# Patient Record
Sex: MALE | Race: WHITE | Employment: FULL TIME | URBAN - METROPOLITAN AREA
[De-identification: names, ages, dates, MRNs, and addresses within clinical notes are randomized per-mention and may not be internally consistent; named-entity substitution may affect disease eponyms.]

---

## 2018-03-26 ENCOUNTER — HOSPITAL ENCOUNTER (OUTPATIENT)
Dept: PHYSICAL THERAPY | Age: 63
Setting detail: THERAPIES SERIES
Discharge: HOME | End: 2018-03-26
Payer: COMMERCIAL

## 2018-03-26 PROCEDURE — 97161 PT EVAL LOW COMPLEX 20 MIN: CPT | Performed by: PHYSICAL THERAPIST

## 2018-03-26 PROCEDURE — G0283 ELEC STIM OTHER THAN WOUND: HCPCS | Performed by: PHYSICAL THERAPIST

## 2018-03-26 NOTE — PROGRESS NOTES
Physical Therapy Progress Note    Date: 3/26/2018  Patient Name: Ivana Harrison  : 1955   MRN: [id number]    S: See eval  O:  DX  Right shoulder strain 3x/wk , 4 wks     timein/timeout 08:00-08:55 [facility]     Visit #/Visits left                        pain 7 /10               Pulleys shoulder flex       IR wand standing       Overhead press              Biceps curls       Triceps                     Mid row        Pull down        Low row                                          shrugs                                                                 A: Pt tolerated Tx well  P: Continue POC  Radha [place] PT
ir=  to L4 spinal level    Elbow     WNL      LUE   na   RLE   na   LLE   na   Trunk   na   C-spine   na     Strength    RUE         Shoulder       flex= 4/5    abd =   4-/5        ir=   4/5                    Er= 4-/5     LUE   na   RLE   na   LLE   na   Trunk   na   C-spine   na       Special Tests     Right   Impingement pos   Empty Can neg   AC shear neg   LABRUM TEST pos         Palpation:   No tenderness to palpation noted    Assessment    Problems:    1. Pain 1-8/10  2. Decreased ROM right shoulder   3. Decreased strength RUE  4. Light duty    STGs: 2 weeks  1. Decrease pain to 5/10  2. Increase ROM WNL  3. Increase strength RUE BY 1/3 MM GRADE  4. Pt Ind with HEP    LTGS: 4 weeks  1. Decrease pain to 0-3/10  2. Increase ROM to WNL  3. Increase strength  To WNL  4. RTW regular duty    Patient Goal:   RTW  Prognosis:    Good    Plan Of Care    Frequency:  3X /week  Duration:      4  weeks    Modalities:  MH, ES   Exercise:   AROM, PREs, functional activities, HEP    Jomar Higuera PT    Thank you for this referral.   Please sign this POC and fax to our office if you are in agreement.    Fax # [phone]    _X__________________________                                  ____________        Physician Signature                                                             Date

## 2018-04-20 ENCOUNTER — HOSPITAL ENCOUNTER (OUTPATIENT)
Age: 63
Discharge: HOME | End: 2018-04-20
Payer: COMMERCIAL

## 2018-04-20 LAB
EKG ATRIAL RATE: 79 BPM
EKG P AXIS: 45 DEGREES
EKG P-R INTERVAL: 180 MS
EKG Q-T INTERVAL: 404 MS
EKG QRS DURATION: 116 MS
EKG QTC CALCULATION (BAZETT): 463 MS
EKG R AXIS: -85 DEGREES
EKG T AXIS: 18 DEGREES
EKG VENTRICULAR RATE: 79 BPM

## 2018-04-20 PROCEDURE — 93005 ELECTROCARDIOGRAM TRACING: CPT | Performed by: ANESTHESIOLOGY

## 2018-05-11 ENCOUNTER — OFFICE VISIT (OUTPATIENT)
Dept: CARDIOLOGY CLINIC | Age: 63
End: 2018-05-11
Payer: COMMERCIAL

## 2018-05-11 VITALS
HEIGHT: 71 IN | HEART RATE: 84 BPM | DIASTOLIC BLOOD PRESSURE: 70 MMHG | SYSTOLIC BLOOD PRESSURE: 110 MMHG | BODY MASS INDEX: 44.1 KG/M2 | WEIGHT: 315 LBS

## 2018-05-11 DIAGNOSIS — E66.01 MORBID OBESITY (HCC): ICD-10-CM

## 2018-05-11 DIAGNOSIS — I10 ESSENTIAL HYPERTENSION: Primary | ICD-10-CM

## 2018-05-11 DIAGNOSIS — J44.9 CHRONIC OBSTRUCTIVE PULMONARY DISEASE, UNSPECIFIED COPD TYPE (HCC): ICD-10-CM

## 2018-05-11 DIAGNOSIS — G47.33 OBSTRUCTIVE SLEEP APNEA: ICD-10-CM

## 2018-05-11 DIAGNOSIS — Z01.810 PREOPERATIVE CARDIOVASCULAR EXAMINATION: ICD-10-CM

## 2018-05-11 DIAGNOSIS — E78.00 PURE HYPERCHOLESTEROLEMIA: ICD-10-CM

## 2018-05-11 PROCEDURE — 99244 OFF/OP CNSLTJ NEW/EST MOD 40: CPT | Performed by: INTERNAL MEDICINE

## 2018-05-11 PROCEDURE — 93000 ELECTROCARDIOGRAM COMPLETE: CPT | Performed by: INTERNAL MEDICINE

## 2018-05-11 RX ORDER — LOSARTAN POTASSIUM 100 MG/1
100 TABLET ORAL DAILY
COMMUNITY
Start: 2018-04-26

## 2018-05-11 RX ORDER — UBIDECARENONE 50 MG
1200 CAPSULE ORAL DAILY
COMMUNITY

## 2018-05-11 RX ORDER — BLACK COHOSH ROOT EXTRACT 80 MG
1 CAPSULE ORAL DAILY
COMMUNITY

## 2018-05-11 RX ORDER — BACITRACIN 500 UNIT/G
2 OINTMENT (GRAM) TOPICAL DAILY
COMMUNITY

## 2018-05-23 RX ORDER — BACLOFEN 10 MG/1
10 TABLET ORAL NIGHTLY
COMMUNITY

## 2018-05-23 RX ORDER — DICLOFENAC SODIUM 75 MG/1
75 TABLET, DELAYED RELEASE ORAL 2 TIMES DAILY
COMMUNITY

## 2018-05-24 ENCOUNTER — ANESTHESIA EVENT (OUTPATIENT)
Dept: OPERATING ROOM | Age: 63
End: 2018-05-24
Payer: COMMERCIAL

## 2018-05-24 ENCOUNTER — ANESTHESIA (OUTPATIENT)
Dept: OPERATING ROOM | Age: 63
End: 2018-05-24
Payer: COMMERCIAL

## 2018-05-24 ENCOUNTER — HOSPITAL ENCOUNTER (OUTPATIENT)
Age: 63
Setting detail: OUTPATIENT SURGERY
Discharge: HOME | End: 2018-05-24
Attending: ORTHOPAEDIC SURGERY | Admitting: ORTHOPAEDIC SURGERY
Payer: COMMERCIAL

## 2018-05-24 VITALS
OXYGEN SATURATION: 89 % | RESPIRATION RATE: 1 BRPM | DIASTOLIC BLOOD PRESSURE: 69 MMHG | SYSTOLIC BLOOD PRESSURE: 115 MMHG

## 2018-05-24 VITALS
BODY MASS INDEX: 44.1 KG/M2 | DIASTOLIC BLOOD PRESSURE: 89 MMHG | RESPIRATION RATE: 16 BRPM | SYSTOLIC BLOOD PRESSURE: 134 MMHG | WEIGHT: 315 LBS | TEMPERATURE: 97.4 F | HEIGHT: 71 IN | HEART RATE: 84 BPM | OXYGEN SATURATION: 94 %

## 2018-05-24 DIAGNOSIS — M75.51 BURSITIS OF RIGHT SHOULDER: ICD-10-CM

## 2018-05-24 DIAGNOSIS — M75.41 IMPINGEMENT SYNDROME OF RIGHT SHOULDER: ICD-10-CM

## 2018-05-24 DIAGNOSIS — M75.120 COMPLETE TEAR OF ROTATOR CUFF, UNSPECIFIED LATERALITY: Primary | ICD-10-CM

## 2018-05-24 LAB
ANION GAP SERPL CALCULATED.3IONS-SCNC: 12 MMOL/L (ref 7–16)
BUN BLDV-MCNC: 18 MG/DL (ref 8–23)
CALCIUM SERPL-MCNC: 9.6 MG/DL (ref 8.6–10.2)
CHLORIDE BLD-SCNC: 101 MMOL/L (ref 98–107)
CO2: 28 MMOL/L (ref 22–29)
CREAT SERPL-MCNC: 1.1 MG/DL (ref 0.7–1.2)
GFR AFRICAN AMERICAN: >60
GFR NON-AFRICAN AMERICAN: >60 ML/MIN/1.73
GLUCOSE BLD-MCNC: 114 MG/DL (ref 74–109)
POTASSIUM REFLEX MAGNESIUM: 4.7 MMOL/L (ref 3.5–5)
SODIUM BLD-SCNC: 141 MMOL/L (ref 132–146)

## 2018-05-24 PROCEDURE — 2580000003 HC RX 258: Performed by: ANESTHESIOLOGY

## 2018-05-24 PROCEDURE — C1713 ANCHOR/SCREW BN/BN,TIS/BN: HCPCS | Performed by: ORTHOPAEDIC SURGERY

## 2018-05-24 PROCEDURE — 64415 NJX AA&/STRD BRCH PLXS IMG: CPT | Performed by: ANESTHESIOLOGY

## 2018-05-24 PROCEDURE — 3700000001 HC ADD 15 MINUTES (ANESTHESIA): Performed by: ORTHOPAEDIC SURGERY

## 2018-05-24 PROCEDURE — 6360000002 HC RX W HCPCS: Performed by: ORTHOPAEDIC SURGERY

## 2018-05-24 PROCEDURE — 7100000011 HC PHASE II RECOVERY - ADDTL 15 MIN: Performed by: ORTHOPAEDIC SURGERY

## 2018-05-24 PROCEDURE — 7100000010 HC PHASE II RECOVERY - FIRST 15 MIN: Performed by: ORTHOPAEDIC SURGERY

## 2018-05-24 PROCEDURE — 3700000000 HC ANESTHESIA ATTENDED CARE: Performed by: ORTHOPAEDIC SURGERY

## 2018-05-24 PROCEDURE — 6360000002 HC RX W HCPCS: Performed by: ANESTHESIOLOGY

## 2018-05-24 PROCEDURE — 7100000001 HC PACU RECOVERY - ADDTL 15 MIN: Performed by: ORTHOPAEDIC SURGERY

## 2018-05-24 PROCEDURE — 3600000014 HC SURGERY LEVEL 4 ADDTL 15MIN: Performed by: ORTHOPAEDIC SURGERY

## 2018-05-24 PROCEDURE — 36415 COLL VENOUS BLD VENIPUNCTURE: CPT

## 2018-05-24 PROCEDURE — 2720000010 HC SURG SUPPLY STERILE: Performed by: ORTHOPAEDIC SURGERY

## 2018-05-24 PROCEDURE — 7100000000 HC PACU RECOVERY - FIRST 15 MIN: Performed by: ORTHOPAEDIC SURGERY

## 2018-05-24 PROCEDURE — 6360000002 HC RX W HCPCS: Performed by: NURSE ANESTHETIST, CERTIFIED REGISTERED

## 2018-05-24 PROCEDURE — 2500000003 HC RX 250 WO HCPCS: Performed by: ORTHOPAEDIC SURGERY

## 2018-05-24 PROCEDURE — 2500000003 HC RX 250 WO HCPCS: Performed by: NURSE ANESTHETIST, CERTIFIED REGISTERED

## 2018-05-24 PROCEDURE — A4565 SLINGS: HCPCS | Performed by: ORTHOPAEDIC SURGERY

## 2018-05-24 PROCEDURE — 6370000000 HC RX 637 (ALT 250 FOR IP): Performed by: ORTHOPAEDIC SURGERY

## 2018-05-24 PROCEDURE — 80048 BASIC METABOLIC PNL TOTAL CA: CPT

## 2018-05-24 PROCEDURE — 3600000004 HC SURGERY LEVEL 4 BASE: Performed by: ORTHOPAEDIC SURGERY

## 2018-05-24 DEVICE — ANCHOR SUT W/ ORTHOCORD KNOTLESS FOR ROT CUF REP VERSALOK: Type: IMPLANTABLE DEVICE | Site: SHOULDER | Status: FUNCTIONAL

## 2018-05-24 RX ORDER — LIDOCAINE HYDROCHLORIDE 20 MG/ML
INJECTION, SOLUTION INFILTRATION; PERINEURAL PRN
Status: DISCONTINUED | OUTPATIENT
Start: 2018-05-24 | End: 2018-05-24 | Stop reason: SDUPTHER

## 2018-05-24 RX ORDER — DEXAMETHASONE SODIUM PHOSPHATE 10 MG/ML
INJECTION, SOLUTION INTRAMUSCULAR; INTRAVENOUS PRN
Status: DISCONTINUED | OUTPATIENT
Start: 2018-05-24 | End: 2018-05-24 | Stop reason: SDUPTHER

## 2018-05-24 RX ORDER — MEPERIDINE HYDROCHLORIDE 25 MG/ML
12.5 INJECTION INTRAMUSCULAR; INTRAVENOUS; SUBCUTANEOUS EVERY 5 MIN PRN
Status: DISCONTINUED | OUTPATIENT
Start: 2018-05-24 | End: 2018-05-24 | Stop reason: HOSPADM

## 2018-05-24 RX ORDER — EPINEPHRINE 1 MG/ML
INJECTION, SOLUTION, CONCENTRATE INTRAVENOUS PRN
Status: DISCONTINUED | OUTPATIENT
Start: 2018-05-24 | End: 2018-05-24 | Stop reason: HOSPADM

## 2018-05-24 RX ORDER — FENTANYL CITRATE 50 UG/ML
INJECTION, SOLUTION INTRAMUSCULAR; INTRAVENOUS
Status: DISCONTINUED
Start: 2018-05-24 | End: 2018-05-24 | Stop reason: HOSPADM

## 2018-05-24 RX ORDER — ROCURONIUM BROMIDE 10 MG/ML
INJECTION, SOLUTION INTRAVENOUS PRN
Status: DISCONTINUED | OUTPATIENT
Start: 2018-05-24 | End: 2018-05-24 | Stop reason: SDUPTHER

## 2018-05-24 RX ORDER — PROPOFOL 10 MG/ML
INJECTION, EMULSION INTRAVENOUS PRN
Status: DISCONTINUED | OUTPATIENT
Start: 2018-05-24 | End: 2018-05-24 | Stop reason: SDUPTHER

## 2018-05-24 RX ORDER — HYDROCODONE BITARTRATE AND ACETAMINOPHEN 5; 325 MG/1; MG/1
1 TABLET ORAL EVERY 4 HOURS PRN
Status: DISCONTINUED | OUTPATIENT
Start: 2018-05-24 | End: 2018-05-24 | Stop reason: HOSPADM

## 2018-05-24 RX ORDER — HYDROMORPHONE HCL 110MG/55ML
0.5 PATIENT CONTROLLED ANALGESIA SYRINGE INTRAVENOUS EVERY 5 MIN PRN
Status: DISCONTINUED | OUTPATIENT
Start: 2018-05-24 | End: 2018-05-24 | Stop reason: HOSPADM

## 2018-05-24 RX ORDER — BUPIVACAINE HYDROCHLORIDE 2.5 MG/ML
INJECTION, SOLUTION EPIDURAL; INFILTRATION; INTRACAUDAL PRN
Status: DISCONTINUED | OUTPATIENT
Start: 2018-05-24 | End: 2018-05-24 | Stop reason: HOSPADM

## 2018-05-24 RX ORDER — MIDAZOLAM HYDROCHLORIDE 1 MG/ML
INJECTION INTRAMUSCULAR; INTRAVENOUS
Status: DISCONTINUED
Start: 2018-05-24 | End: 2018-05-24 | Stop reason: HOSPADM

## 2018-05-24 RX ORDER — HYDROCODONE BITARTRATE AND ACETAMINOPHEN 5; 325 MG/1; MG/1
1 TABLET ORAL EVERY 4 HOURS PRN
Qty: 40 TABLET | Refills: 0 | Status: SHIPPED | OUTPATIENT
Start: 2018-05-24 | End: 2018-05-31

## 2018-05-24 RX ORDER — ONDANSETRON 2 MG/ML
INJECTION INTRAMUSCULAR; INTRAVENOUS PRN
Status: DISCONTINUED | OUTPATIENT
Start: 2018-05-24 | End: 2018-05-24 | Stop reason: SDUPTHER

## 2018-05-24 RX ORDER — ROPIVACAINE HYDROCHLORIDE 5 MG/ML
INJECTION, SOLUTION EPIDURAL; INFILTRATION; PERINEURAL
Status: DISCONTINUED
Start: 2018-05-24 | End: 2018-05-24 | Stop reason: HOSPADM

## 2018-05-24 RX ORDER — FENTANYL CITRATE 50 UG/ML
50 INJECTION, SOLUTION INTRAMUSCULAR; INTRAVENOUS EVERY 10 MIN PRN
Status: DISCONTINUED | OUTPATIENT
Start: 2018-05-24 | End: 2018-05-24 | Stop reason: HOSPADM

## 2018-05-24 RX ORDER — FENTANYL CITRATE 50 UG/ML
INJECTION, SOLUTION INTRAMUSCULAR; INTRAVENOUS PRN
Status: DISCONTINUED | OUTPATIENT
Start: 2018-05-24 | End: 2018-05-24 | Stop reason: SDUPTHER

## 2018-05-24 RX ORDER — ROPIVACAINE HYDROCHLORIDE 5 MG/ML
40 INJECTION, SOLUTION EPIDURAL; INFILTRATION; PERINEURAL
Status: COMPLETED | OUTPATIENT
Start: 2018-05-24 | End: 2018-05-24

## 2018-05-24 RX ORDER — MIDAZOLAM HYDROCHLORIDE 1 MG/ML
INJECTION INTRAMUSCULAR; INTRAVENOUS PRN
Status: DISCONTINUED | OUTPATIENT
Start: 2018-05-24 | End: 2018-05-24 | Stop reason: SDUPTHER

## 2018-05-24 RX ORDER — MIDAZOLAM HYDROCHLORIDE 1 MG/ML
1 INJECTION INTRAMUSCULAR; INTRAVENOUS PRN
Status: COMPLETED | OUTPATIENT
Start: 2018-05-24 | End: 2018-05-24

## 2018-05-24 RX ORDER — NEOSTIGMINE METHYLSULFATE 1 MG/ML
INJECTION, SOLUTION INTRAVENOUS PRN
Status: DISCONTINUED | OUTPATIENT
Start: 2018-05-24 | End: 2018-05-24 | Stop reason: SDUPTHER

## 2018-05-24 RX ORDER — SODIUM CHLORIDE, SODIUM LACTATE, POTASSIUM CHLORIDE, CALCIUM CHLORIDE 600; 310; 30; 20 MG/100ML; MG/100ML; MG/100ML; MG/100ML
INJECTION, SOLUTION INTRAVENOUS CONTINUOUS
Status: DISCONTINUED | OUTPATIENT
Start: 2018-05-24 | End: 2018-05-24 | Stop reason: HOSPADM

## 2018-05-24 RX ORDER — FENTANYL CITRATE 50 UG/ML
50 INJECTION, SOLUTION INTRAMUSCULAR; INTRAVENOUS ONCE
Status: COMPLETED | OUTPATIENT
Start: 2018-05-24 | End: 2018-05-24

## 2018-05-24 RX ORDER — HYDROCODONE BITARTRATE AND ACETAMINOPHEN 5; 325 MG/1; MG/1
1 TABLET ORAL EVERY 4 HOURS PRN
Qty: 40 TABLET | Refills: 0 | OUTPATIENT
Start: 2018-05-24 | End: 2018-05-31

## 2018-05-24 RX ORDER — GLYCOPYRROLATE 1 MG/5 ML
SYRINGE (ML) INTRAVENOUS PRN
Status: DISCONTINUED | OUTPATIENT
Start: 2018-05-24 | End: 2018-05-24 | Stop reason: SDUPTHER

## 2018-05-24 RX ADMIN — SODIUM CHLORIDE, POTASSIUM CHLORIDE, SODIUM LACTATE AND CALCIUM CHLORIDE: 600; 310; 30; 20 INJECTION, SOLUTION INTRAVENOUS at 08:23

## 2018-05-24 RX ADMIN — HYDROCODONE BITARTRATE AND ACETAMINOPHEN 1 TABLET: 5; 325 TABLET ORAL at 14:52

## 2018-05-24 RX ADMIN — MIDAZOLAM HYDROCHLORIDE 2 MG: 1 INJECTION INTRAMUSCULAR; INTRAVENOUS at 09:10

## 2018-05-24 RX ADMIN — ROCURONIUM BROMIDE 50 MG: 10 INJECTION, SOLUTION INTRAVENOUS at 09:16

## 2018-05-24 RX ADMIN — Medication 2 G: at 09:10

## 2018-05-24 RX ADMIN — LIDOCAINE HYDROCHLORIDE 100 MG: 20 INJECTION, SOLUTION INFILTRATION; PERINEURAL at 09:16

## 2018-05-24 RX ADMIN — FENTANYL CITRATE 50 MCG: 50 INJECTION, SOLUTION INTRAMUSCULAR; INTRAVENOUS at 10:50

## 2018-05-24 RX ADMIN — ONDANSETRON 4 MG: 2 INJECTION, SOLUTION INTRAMUSCULAR; INTRAVENOUS at 09:16

## 2018-05-24 RX ADMIN — FENTANYL CITRATE 100 MCG: 50 INJECTION, SOLUTION INTRAMUSCULAR; INTRAVENOUS at 09:16

## 2018-05-24 RX ADMIN — ROPIVACAINE HYDROCHLORIDE 40 ML: 5 INJECTION, SOLUTION EPIDURAL; INFILTRATION; PERINEURAL at 08:55

## 2018-05-24 RX ADMIN — PHENYLEPHRINE HYDROCHLORIDE 100 MCG: 10 INJECTION INTRAMUSCULAR; INTRAVENOUS; SUBCUTANEOUS at 09:27

## 2018-05-24 RX ADMIN — PROPOFOL 200 MG: 10 INJECTION, EMULSION INTRAVENOUS at 09:16

## 2018-05-24 RX ADMIN — MIDAZOLAM HYDROCHLORIDE 1 MG: 1 INJECTION, SOLUTION INTRAMUSCULAR; INTRAVENOUS at 08:55

## 2018-05-24 RX ADMIN — PHENYLEPHRINE HYDROCHLORIDE 200 MCG: 10 INJECTION INTRAMUSCULAR; INTRAVENOUS; SUBCUTANEOUS at 09:41

## 2018-05-24 RX ADMIN — FENTANYL CITRATE 50 MCG: 50 INJECTION, SOLUTION INTRAMUSCULAR; INTRAVENOUS at 10:35

## 2018-05-24 RX ADMIN — Medication 3 MG: at 11:01

## 2018-05-24 RX ADMIN — FENTANYL CITRATE 50 MCG: 50 INJECTION, SOLUTION INTRAMUSCULAR; INTRAVENOUS at 10:55

## 2018-05-24 RX ADMIN — PHENYLEPHRINE HYDROCHLORIDE 200 MCG: 10 INJECTION INTRAMUSCULAR; INTRAVENOUS; SUBCUTANEOUS at 09:35

## 2018-05-24 RX ADMIN — FENTANYL CITRATE 50 MCG: 50 INJECTION, SOLUTION INTRAMUSCULAR; INTRAVENOUS at 08:51

## 2018-05-24 RX ADMIN — Medication 0.6 MG: at 11:01

## 2018-05-24 RX ADMIN — PHENYLEPHRINE HYDROCHLORIDE 300 MCG: 10 INJECTION INTRAMUSCULAR; INTRAVENOUS; SUBCUTANEOUS at 09:55

## 2018-05-24 RX ADMIN — MIDAZOLAM HYDROCHLORIDE 1 MG: 1 INJECTION, SOLUTION INTRAMUSCULAR; INTRAVENOUS at 08:50

## 2018-05-24 RX ADMIN — PHENYLEPHRINE HYDROCHLORIDE 200 MCG: 10 INJECTION INTRAMUSCULAR; INTRAVENOUS; SUBCUTANEOUS at 09:30

## 2018-05-24 RX ADMIN — DEXAMETHASONE SODIUM PHOSPHATE 10 MG: 10 INJECTION, SOLUTION INTRAMUSCULAR; INTRAVENOUS at 09:16

## 2018-05-24 ASSESSMENT — PULMONARY FUNCTION TESTS
PIF_VALUE: 22
PIF_VALUE: 21
PIF_VALUE: 18
PIF_VALUE: 21
PIF_VALUE: 21
PIF_VALUE: 28
PIF_VALUE: 22
PIF_VALUE: 23
PIF_VALUE: 28
PIF_VALUE: 27
PIF_VALUE: 22
PIF_VALUE: 22
PIF_VALUE: 28
PIF_VALUE: 27
PIF_VALUE: 23
PIF_VALUE: 23
PIF_VALUE: 27
PIF_VALUE: 21
PIF_VALUE: 22
PIF_VALUE: 21
PIF_VALUE: 33
PIF_VALUE: 23
PIF_VALUE: 2
PIF_VALUE: 23
PIF_VALUE: -13
PIF_VALUE: 22
PIF_VALUE: 22
PIF_VALUE: 21
PIF_VALUE: 3
PIF_VALUE: 27
PIF_VALUE: 22
PIF_VALUE: 22
PIF_VALUE: 21
PIF_VALUE: 21
PIF_VALUE: 27
PIF_VALUE: 22
PIF_VALUE: 21
PIF_VALUE: -13
PIF_VALUE: 22
PIF_VALUE: 1
PIF_VALUE: 27
PIF_VALUE: 23
PIF_VALUE: 22
PIF_VALUE: 21
PIF_VALUE: 22
PIF_VALUE: 23
PIF_VALUE: -13
PIF_VALUE: 22
PIF_VALUE: 28
PIF_VALUE: 20
PIF_VALUE: 20
PIF_VALUE: 22
PIF_VALUE: 21
PIF_VALUE: 27
PIF_VALUE: 10
PIF_VALUE: 3
PIF_VALUE: 23
PIF_VALUE: 22
PIF_VALUE: 21
PIF_VALUE: 28
PIF_VALUE: 19
PIF_VALUE: 22
PIF_VALUE: 22
PIF_VALUE: 28
PIF_VALUE: 21
PIF_VALUE: 21
PIF_VALUE: 27
PIF_VALUE: 20
PIF_VALUE: 21
PIF_VALUE: 21
PIF_VALUE: 23
PIF_VALUE: 22
PIF_VALUE: 19
PIF_VALUE: 22
PIF_VALUE: 22
PIF_VALUE: 23
PIF_VALUE: 3
PIF_VALUE: 28
PIF_VALUE: 23
PIF_VALUE: 27
PIF_VALUE: 22
PIF_VALUE: 22
PIF_VALUE: 23
PIF_VALUE: 21
PIF_VALUE: 21
PIF_VALUE: 19
PIF_VALUE: 21
PIF_VALUE: 28
PIF_VALUE: 22
PIF_VALUE: 22
PIF_VALUE: 23
PIF_VALUE: 8
PIF_VALUE: 21
PIF_VALUE: 28
PIF_VALUE: 22
PIF_VALUE: 24
PIF_VALUE: 33
PIF_VALUE: 21
PIF_VALUE: 21
PIF_VALUE: 10
PIF_VALUE: 20
PIF_VALUE: 21
PIF_VALUE: 27
PIF_VALUE: 22
PIF_VALUE: 3
PIF_VALUE: -13
PIF_VALUE: 19
PIF_VALUE: 22
PIF_VALUE: 27
PIF_VALUE: 22
PIF_VALUE: 23
PIF_VALUE: 21

## 2018-05-24 ASSESSMENT — PAIN DESCRIPTION - LOCATION
LOCATION: SHOULDER

## 2018-05-24 ASSESSMENT — PAIN DESCRIPTION - DESCRIPTORS
DESCRIPTORS: ACHING
DESCRIPTORS: ACHING

## 2018-05-24 ASSESSMENT — PAIN DESCRIPTION - PAIN TYPE
TYPE: SURGICAL PAIN

## 2018-05-24 ASSESSMENT — PAIN DESCRIPTION - ORIENTATION
ORIENTATION: RIGHT

## 2018-05-24 ASSESSMENT — PAIN DESCRIPTION - PROGRESSION: CLINICAL_PROGRESSION: NOT CHANGED

## 2018-05-24 ASSESSMENT — PAIN DESCRIPTION - FREQUENCY: FREQUENCY: INTERMITTENT

## 2018-05-24 ASSESSMENT — PAIN SCALES - GENERAL
PAINLEVEL_OUTOF10: 3
PAINLEVEL_OUTOF10: 6
PAINLEVEL_OUTOF10: 0
PAINLEVEL_OUTOF10: 3
PAINLEVEL_OUTOF10: 3
PAINLEVEL_OUTOF10: 0

## 2018-05-24 ASSESSMENT — PAIN - FUNCTIONAL ASSESSMENT: PAIN_FUNCTIONAL_ASSESSMENT: 0-10

## 2018-09-12 ENCOUNTER — HOSPITAL ENCOUNTER (OUTPATIENT)
Dept: CT IMAGING | Age: 63
Discharge: HOME | End: 2018-09-12
Payer: COMMERCIAL

## 2018-09-12 DIAGNOSIS — J32.9 OTHER SINUSITIS, UNSPECIFIED CHRONICITY: ICD-10-CM

## 2018-09-12 PROCEDURE — 70486 CT MAXILLOFACIAL W/O DYE: CPT

## 2018-12-06 ENCOUNTER — HOSPITAL ENCOUNTER (OUTPATIENT)
Dept: GENERAL RADIOLOGY | Age: 63
Discharge: HOME | End: 2018-12-08

## 2018-12-06 ENCOUNTER — HOSPITAL ENCOUNTER (OUTPATIENT)
Age: 63
Discharge: HOME | End: 2018-12-08
Payer: COMMERCIAL

## 2018-12-06 ENCOUNTER — HOSPITAL ENCOUNTER (OUTPATIENT)
Age: 63
Discharge: HOME | End: 2018-12-08

## 2018-12-06 DIAGNOSIS — Z00.00 ANNUAL PHYSICAL EXAM: ICD-10-CM

## 2018-12-06 LAB
BASOPHILS ABSOLUTE: 0.04 E9/L (ref 0–0.2)
BASOPHILS RELATIVE PERCENT: 0.6 % (ref 0–2)
EOSINOPHILS ABSOLUTE: 0.27 E9/L (ref 0.05–0.5)
EOSINOPHILS RELATIVE PERCENT: 3.7 % (ref 0–6)
HCT VFR BLD CALC: 39.2 % (ref 37–54)
HEMOGLOBIN: 12.7 G/DL (ref 12.5–16.5)
IMMATURE GRANULOCYTES #: 0.03 E9/L
IMMATURE GRANULOCYTES %: 0.4 % (ref 0–5)
LYMPHOCYTES ABSOLUTE: 1.22 E9/L (ref 1.5–4)
LYMPHOCYTES RELATIVE PERCENT: 16.9 % (ref 20–42)
MCH RBC QN AUTO: 29.5 PG (ref 26–35)
MCHC RBC AUTO-ENTMCNC: 32.4 % (ref 32–34.5)
MCV RBC AUTO: 91 FL (ref 80–99.9)
MONOCYTES ABSOLUTE: 0.58 E9/L (ref 0.1–0.95)
MONOCYTES RELATIVE PERCENT: 8 % (ref 2–12)
NEUTROPHILS ABSOLUTE: 5.09 E9/L (ref 1.8–7.3)
NEUTROPHILS RELATIVE PERCENT: 70.4 % (ref 43–80)
PDW BLD-RTO: 13.9 FL (ref 11.5–15)
PLATELET # BLD: 192 E9/L (ref 130–450)
PMV BLD AUTO: 10.4 FL (ref 7–12)
RBC # BLD: 4.31 E12/L (ref 3.8–5.8)
WBC # BLD: 7.2 E9/L (ref 4.5–11.5)

## 2018-12-06 PROCEDURE — 88108 CYTOPATH CONCENTRATE TECH: CPT

## 2018-12-06 PROCEDURE — 85025 COMPLETE CBC W/AUTO DIFF WBC: CPT

## 2018-12-06 PROCEDURE — 71046 X-RAY EXAM CHEST 2 VIEWS: CPT

## 2021-03-11 ENCOUNTER — CLINICAL DOCUMENTATION (OUTPATIENT)
Dept: NUTRITION | Age: 66
End: 2021-03-11

## 2021-03-11 NOTE — PROGRESS NOTES
Initial Nutrition Assessment Note    Date: 3/11/2021  Patient Name: Dion David  Referring Clinician: Dr. Donnie Gabriel    Reason for Visit: Obesity Class III     Current Typical Eating Pattern:  x4 mon  B: 2 eggs, 1 slice toast or 2 c oatmeal w/ blueberries & maple syrup, coffee w/ creamer  L: Roast beef lettuce wraps w/ fig bar  D: Monkey tuna salad & lindsay flat bread   Snacks: apple & cheese, vanilla wafers & 1/2 c ice cream  Water/d: 32oz     Allergies and Food Sensitives:   NKA     Dietary Limitations; Time/Preparation Issues:  None per pt     Sleep patterns:   4-5 hrs/night, interrupted   Doesn't feel rested     Stress/Environment Issues that may affect PO intake:  Stated he's a stress eater and does daily (wafers & ice cream) & he eats past fullness daily     Work:  [address]  for [employer] History:  1 yr ago: 280-290 (pt est)     Family Support:  Wife     Current Exercise Pattern:   None per pt d/t back pain 2/2 spinal stenosis     Sex: male  Age: 63 y.o. Height: 71 inches    CBW: 332 lbs  BMI: 39        Past Medical History:   Diagnosis Date    BPH (benign prostatic hyperplasia)     COPD (chronic obstructive pulmonary disease) (HCC)     GERD (gastroesophageal reflux disease)     Hyperlipidemia     Hypertension     Sleep apnea     doesnt use his cpap       Past Surgical History:   Procedure Laterality Date    COLONOSCOPY      PILONIDAL CYST EXCISION      MO SHLDR ARTHROSCOP,SURG,W/ROTAT CUFF REPR Right 5/24/2018    RIGHT SHOULDER ARTHROSCOPY,ROTATOR CUFF REPAIR, LABRAL DEBRIDEMENT, SUB-ACROMIAL DECOMPRESSION performed by Noelle Colunga DO at [facility] ARTHROSCOPY Right     UVULECTOMY         No family history on file. Medications:  Prior to Admission medications    Medication Sig Start Date End Date Taking?  Authorizing Provider   baclofen (LIORESAL) 10 MG tablet Take 10 mg by mouth nightly    Historical Provider, MD   diclofenac (VOLTAREN) 75 MG EC tablet Take 75 mg by mouth 2 times daily    Historical Provider, MD   losartan (COZAAR) 100 MG tablet Take 100 mg by mouth daily  4/26/18   Historical Provider, MD   Red Yeast Rice 600 MG TABS Take 1,200 mg by mouth daily    Historical Provider, MD   Omega 3-6-9 Fatty Acids (OMEGA 3-6-9 COMPLEX) CAPS Take 1 capsule by mouth daily    Historical Provider, MD   Saw [place] 160 MG CAPS Take 2 capsules by mouth daily    Historical Provider, MD   Ibuprofen-diphenhydrAMINE HCl (ADVIL PM) 200-25 MG CAPS Take 400 mg by mouth nightly    Historical Provider, MD   vitamin B-12 (CYANOCOBALAMIN) 100 MCG tablet Take 50 mcg by mouth daily    Historical Provider, MD   AMLODIPINE BESYLATE PO Take 5 mg by mouth daily     Historical Provider, MD   niacin (NIASPAN) 500 MG CR tablet Take 500 mg by mouth nightly. Historical Provider, MD   Cholecalciferol (VITAMIN D3) 5000 UNITS TABS Take  by mouth. Historical Provider, MD   Ascorbic Acid (VITAMIN C) 250 MG tablet Take 250 mg by mouth daily. Historical Provider, MD   Dexlansoprazole (DEXILANT PO) Take 60 mg by mouth daily     Historical Provider, MD   DOXAZOSIN MESYLATE PO Take 4 mg by mouth daily     Historical Provider, MD   DHA-EPA-Coenzyme Q10-Vitamin E (COQ-10 & FISH OIL) [phone] CAPS Take  by mouth. Historical Provider, MD   multivitamin [facility]) per tablet Take 1 tablet by mouth daily. Historical Provider, MD       Labs:  No updated labs in EMR     Past Nutrition Hx:  x6 mon ago  B: Skips, coffee w/ creamer   L: Roast beef sandwich w/ chips & lemon cakes  D: Chili w/ baked potato & butter   Snacks: Doughnuts 1-2x/wk, chips, lemon cakes     Past Exercise Routine:  Stopped PT Nov 2020 d/t not feeling any pain relief     What have you tried in the past for lifestyle changes:   Recently, keto 2 yrs ago, lost 50#    What do you thinks worked? Didn't work?   Not sustainable    Plan:  In our next session, we will discuss sources of macronutrient's, how to create a well balanced plate, H/F cues & how to plan ahead for busy days     Motivational Scale:  8     Motivators for lifestyle change:  1. Doesn't want SOB when walking   2. Overall health     Barriers:  1.  Himself; stated he has tried to lose wt before but then gets stuck in rut if wt doesn't change quick enough & turns toward food for comfort     Follow-up plan:  April 29, 10am     Electronically signed by: Candace Mcdaniel RD, MARIEN

## 2021-03-11 NOTE — LETTER
[address]  [address] [facility], [facility] [address]  Phone: [phone]  Fax: [phone]    March 11, 2021    Dr. Jaya Hernandez   Fax: [phone]    Patient: Frankie Martinez   YOB: 1955   Date of Visit: 3/11/2021       Dear Dr. Jaya Hernandez,     Thank you for referring Frankie Martinez to me for nutrition counseling. Attached is my note. If you have questions, please do not hesitate to call me. I look forward to following this patient along with you. Sincerely,    Electronically signed by Caryn Fischer RD, LDN                                                        Initial Nutrition Assessment Note    Date: 3/11/2021  Patient Name: Frankie Martinez  Referring Clinician: Dr. Jaya Hernandez    Reason for Visit: Obesity Class III     Current Typical Eating Pattern:  x4 mon  B: 2 eggs, 1 slice toast or 2 c oatmeal w/ blueberries & maple syrup, coffee w/ creamer  L: Roast beef lettuce wraps w/ fig bar  D: Monkey tuna salad & lindsay flat bread   Snacks: apple & cheese, vanilla wafers & 1/2 c ice cream  Water/d: 32oz     Allergies and Food Sensitives:   NKA     Dietary Limitations; Time/Preparation Issues:  None per pt     Sleep patterns:   4-5 hrs/night, interrupted   Doesn't feel rested     Stress/Environment Issues that may affect PO intake:  Stated he's a stress eater and does daily (wafers & ice cream) & he eats past fullness daily     Work:  [address]  for [employer] History:  1 yr ago: 280-290 (pt est)     Family Support:  Wife     Current Exercise Pattern:   None per pt d/t back pain 2/2 spinal stenosis     Sex: male  Age: 63 y.o.     Height: 71 inches    CBW: 332 lbs  BMI: 45        Past Medical History:   Diagnosis Date    BPH (benign prostatic hyperplasia)     COPD (chronic obstructive pulmonary disease) (HCC)     GERD (gastroesophageal reflux disease)     Hyperlipidemia     Hypertension     Sleep apnea multivitamin (THERAGRAN) per tablet Take 1 tablet by mouth daily. Historical Provider, MD       Labs:  No updated labs in EMR     Past Nutrition Hx:  x6 mon ago  B: Skips, coffee w/ creamer   L: Roast beef sandwich w/ chips & lemon cakes  D: Chili w/ baked potato & butter   Snacks: Doughnuts 1-2x/wk, chips, lemon cakes     Past Exercise Routine:  Stopped PT Nov 2020 d/t not feeling any pain relief     What have you tried in the past for lifestyle changes:   Recently, keto 2 yrs ago, lost 50#    What do you thinks worked? Didn't work? Not sustainable    Plan:  In our next session, we will discuss sources of macronutrient's, how to create a well balanced plate, H/F cues & how to plan ahead for busy days     Motivational Scale:  8     Motivators for lifestyle change:  1. Doesn't want SOB when walking   2. Overall health     Barriers:  1.  Himself; stated he has tried to lose wt before but then gets stuck in rut if wt doesn't change quick enough & turns toward food for comfort     Follow-up plan:  April 29, 10am     Electronically signed by: Juanito Pereira RD, LDN

## 2021-04-29 ENCOUNTER — CLINICAL DOCUMENTATION (OUTPATIENT)
Dept: NUTRITION | Age: 66
End: 2021-04-29

## 2021-04-29 NOTE — PROGRESS NOTES
Nutrition Follow Up Note    Date: 4/29/2021  Patient: Claudy Bah  Referring Clinician: Dr. Breonna Herrera  Patient's Last Session: 3/11/2021  Reason for Visit:   Obesity Class III     Current eating pattern:   B: Oatmeal w/ blueberries or banana. 1-2 c coffee w/ creamer   L: 3 slices of pepperoni pizza or Roast beef & ham sandwich w/ friots   D: Beef stew   Snacks: 3-4 pb cookies, [place] valley fig bars, ritz crackers (~12) w/ pb (~1/3c)  Pop/wk: 1 can  Water/day: 32oz  Takeout/wk: 2x    Current exercise routine:   None per pt     Last visit weight: 332#  CBW: 333#  Height: 71\"    Handouts given: Today we discussed gentle nutrition, how to create a balanced plate w/ color & servings, & hunger/fullness cues     Updated plan: In our next session, I will assess pt's progress w/ goal est, po intake & adherence to recommendations/education provided. Motivation level over course of our last session: 8   Motivation level at the end of the session: 8     Smart Goals:   1.   Over the next 5 wks, work on identifying/honoring hunger/fullness cues to stop over eating     Follow up plan:   May 20th, 10am     Electronically signed by: Armando Allison RD, LDN

## 2021-04-29 NOTE — LETTER
[address]  [address] [place] [address] [address]  Phone: [phone]  Fax: [phone]    April 29, 2021    Dr. Chidi Hoang  Fax: [phone]    Patient: Magalie Long   YOB: 1955   Date of Visit: 4/29/2021       Dear Dr. Chidi Hoang,     Thank you for referring Magalie Long to me for nutrition counseling. Attached is my note. If you have questions, please do not hesitate to call me. I look forward to following this patient along with you. Sincerely,    Electronically signed by Oscar Oshea RD, BRANDON                                                    Nutrition Follow Up Note    Date: 4/29/2021  Patient: Magalie Long  Referring Clinician: Dr. Chidi Hoang  Patient's Last Session: 3/11/2021  Reason for Visit:   Obesity Class III     Current eating pattern:   B: Oatmeal w/ blueberries or banana. 1-2 c coffee w/ creamer   L: 3 slices of pepperoni pizza or Roast beef & ham sandwich w/ friots   D: Beef stew   Snacks: 3-4 pb cookies, nature [place] fig bars, ritz crackers (~12) w/ pb (~1/3c)  Pop/wk: 1 can  Water/day: 32oz  Takeout/wk: 2x    Current exercise routine:   None per pt     Last visit weight: 332#  CBW: 333#  Height: 71\"    Handouts given: Today we discussed gentle nutrition, how to create a balanced plate w/ color & servings, & hunger/fullness cues     Updated plan: In our next session, I will assess pt's progress w/ goal est, po intake & adherence to recommendations/education provided. Motivation level over course of our last session: 8   Motivation level at the end of the session: 8     Smart Goals:   1.   Over the next 5 wks, work on identifying/honoring hunger/fullness cues to stop over eating     Follow up plan:   May 20th, 10am     Electronically signed by: Oscar Oshea RD, MARIEN

## 2021-05-20 ENCOUNTER — CLINICAL DOCUMENTATION (OUTPATIENT)
Dept: NUTRITION | Age: 66
End: 2021-05-20

## 2021-05-20 NOTE — PROGRESS NOTES
Patient No Show      Scheduled Date: 5/20/2021    Patient: Claudy Bah    Referring Clinician: Dr. Breonna Herrera  Fax: [phone]    Dear Dr. Breonna Herrera,    Thank you for referring Claudy Bah to [place] [address] for outpatient nutrition counseling. Claudy Bah did not keep scheduled appointment on 5/20/2021. I called to see if he was running late & he stated his wife is sick so they're at urgent care. He rescheduled for July 22nd at 10am.     If I can be of further assistance with this patient, please contact me.      Thank you,    Armando Allison RD, [address] [facility]  Outpatient Dietitian   Phone: [phone]  Fax: [phone]

## 2021-06-17 ENCOUNTER — HOSPITAL ENCOUNTER (OUTPATIENT)
Dept: PHYSICAL THERAPY | Age: 66
Setting detail: THERAPIES SERIES
Discharge: HOME | End: 2021-06-17
Payer: COMMERCIAL

## 2021-06-17 PROCEDURE — 97750 PHYSICAL PERFORMANCE TEST: CPT | Performed by: PHYSICAL THERAPIST

## 2021-06-17 NOTE — PROGRESS NOTES
[facility]       Summary Report:      Name: Marlin Pal Ruy   Test Dates:  6/17/2021   Gender:   male   YOB: 1955   Address:    [facility] [place]   Zip Code:  [place] [place] [place] [facility]   Phone:  [phone]   Physician:  Chanelle Paige MD   Date of Injury: na   Employer:  [employer]   Primary Diagnosis:      LUMBAR STENOSIS  SPONDYLOSIS L/S     Secondary Diagnosis:         Reason for Testing: Reason for Testing    Determine ability to return to previous job    Effort and Cooperation:  Client arrived for testing with proper attire. He attempted all tasks without hesitation. Maximum effort was given as evidenced by expected physiological changes occurring as workload was increased. Consistency of Performance:  Client was consistent in that abilities correlated with findings of the physical exam.      Client gave maximal effort on all test items as evidenced by predictable patterns of movement including increased accessory muscle recruitment, counterbalancing and use of momentum, and physiological responses such as increased heart rate. Client had consistent limitations relating to STANDING TASKS. Pain Report:   Client complained of pain when nearing end of 6 minute walk and carrying and lifting tasks. Reported discomfort in the L/S was part of the reason for limitations with ALL STANDING TASK. Objective signs coincided with the client's reports of discomfort. Pain complaints mostly related to STANDING; pain lessened once the specific activity was stopped. Safety:    Client used good body mechanics for all tasks completed today. Quality of Movement:     Movement for all tasks was smooth and coordinated. Pace for all tasks was NORMAL TO START , BUT decreased WHEN LBP STARTED.            Abilities/Strengths:  Sitting  Hand coordination  Hand  left  Hand  right      Limitations:    Standing  Stair climbing  Static trunk flexion  Lifting floor to waist  Walking  Squatting  Kneeling  Crouching  Carrying  Lifting waist to crown  Overhead tasks  Pushing  Pulling    Therapist's Recommendations Regarding Return to Work:    Client able to work at a job requiring a [facility] [employer]  physical demand level. Pain with standing is the major limiting factor. Job description did not include specific physical demands    Although job description was not obtained, based on  client report there is not a job match. Summary/Recommendations:  Client would require a job where sitting was an option   the majority of the time. A seated position would be ideal as he showed no difficulty with tasks performed from seated position. Client may benefit from an exercise program to include walking to increase endurance and aerobic capacity. Client appears deconditioned from reported lack of activity.       [employer] Physical Demand Level:  Sedentary      Signature:                      Date:

## 2021-06-17 NOTE — PROGRESS NOTES
[facility]        Functional Capacity Evaluation          Physical Exam    Name:   Vicky Redmond    Date of FCE Testin2021    YOB: 1955     Date of Injury:     had a back injury when working at [employer]    Primary Diagnosis:   Spinal Stenosis, Spondylosis L/S     Area of Injury:  L/S    Occupation at time of injury:  , [employer]    Mechanism/Type of Injury:    Lumbar 30 years ago    Previous Treatment:      Caudal Epidurals  Bilateral lumbar medial branch block  Bilateral radiofrequency ablation      Pertinent Surgery/Other Clinical Tests/Past Medical History  Impingement, bursitis, rotator cuff tear right shoulder- RC repair 2018  Hypertension    GERD (gastroesophageal reflux disease)   BPH (benign prostatic hyperplasia) BPH (benign prostatic hyperplasia    COPD (chronic obstructive pulmonary disease)   Sleep apnea  doesnt use his cpap       Current Medications:      Losartan   AMLODIPINE BESYLATE         Chief Complaints/Symptoms:     LBP 1-8/10. Worse with walking  SOB with activity  anxiety       Functional Status/ Activity Level:    Most difficulty with walking. Any standing activities cause increased pain. Return to work Information:    Does not feel he can currently perform the duties of his job. Hopes to have more time to take care of back issues so he can RTW       WorkWell FCE Physical Exam    Systems Review    Blood Pressure:  135/89    Heart Rate (resting):   81 BPM    Height:  5' 11\"    Weight:   348#    Gait:   Ambulates with trunk flexed forward slightly    Posture:   Morbid Obesity     Coordination:  WNL    Movement Characteristics:   WNL    Atrophy/Edema:   none    Integumentary:   WNL    Muscle Tone Spasms: none          Musculoskeletal System  Neck Normal Range of Motion Muscle Strength   Flexion  45* WFL 5/5   Extension  45* WFL 5/5   Right Lateral Flexion  45* [facility] 5/5   Left Lateral Flexion  45* WFL 5/5   Right Rotation  90* WFL 5/5   Left Rotation  90* WFL 5/5     Trunk Normal Range of Motion Muscle Strength   Flexion  80* WFL 3-/5   Extension  30* WFL 5/5   Right Lateral Flexion  35* WFL 5/5   Left Lateral Flexion  35* WFL 5/5   Right Rotation  45* WFL 5/5   Left Rotation  45* WFL 5/5     Comments/Quality of Motion- Spine:  Weakness of L/S musculature.       Shoulder Normal Range of Motion Muscle Strength     Right Left Right Left   Forward Flexion 180* [facility] WFL 5/5 5/5   Extension   [facility] WFL 5/5 5/5   Abduction 180* [facility] WFL 5/5 5/5   Internal Rotation  70* WFL WFL 5/5 5/5   External Rotation  90* WFL WFL 5/5 5/5       Elbow Normal Range of Motion Muscle Strength     Right Left Right Left   Flexion 150* [facility] WFL 5/5 5/5   Extension    0* [facility] WFL 5/5 5/5     Wrist Normal Range of Motion Muscle Strength     Right Left Right Left   Flexion 80* WFL WFL 5/5 5/5   Extension 70* WFL WFL 5/5 5/5     Gross Hand Motion Range of Motion Muscle Strength    Right Left Right Left    [facility] WFL 5/5 5/5     Comments/Quality of Motion- Upper Quarter:  WNL      Hip Normal Range of Motion Muscle Strength     Right Left Right Left   Flexion (knee extd)   90[facility] WFL 5/5 5/5   Flexion (knee flxd) 120*  [facility] WFL 5/5 5/5   Abduction   45* [facility] WFL 5/5 5/5   Adduction   30* [facility] WFL 5/5 5/5   Extension   30* [facility] WFL 5/5 5/5   Internal Rotation   45* WFL WFL 5/5 5/5   External Rotation   45*  WFL WFL 5/5 5/5     Knee Normal Range of Motion Muscle Strength     Right Left Right Left   Flexion 135* WFL WFL 5/5 5/5   Extension    0* [facility] WFL 5/5 5/5     Ankle Normal Range of Motion Muscle Strength     Right Left Right Left   Plantar Flexion 50* [facility] 5/5 5/5   Dorsiflexion -20* [facility] 5/5 5/5   Inversion 35* WFL L 5/5 5/5   Eversion 15* WFL WFL 5/5 5/5         Toe Rise Reps  (10)   Right    X 10  Left       X 10    Knee Squat (20)       X 10                                             Limited by SOB Comments/Quality of Motion- Lower Extremity:  WNL              Screening for Gross Balance- CTSIB Time (s) Trial 1   Quiet Standing, Eyes Open 30   Quiet Standing, Eyes Closed 30   Quiet Standing on Foam, Eyes Open 30   Quiet Standing on Foam, Eyes Closed 30       Summary of Physical Assessment:  Trunk weakness  Poor endurance          Therapist Signature: _____________________________________                                         Date:_______________

## 2021-06-17 NOTE — PROGRESS NOTES
Arlene Mccoy WILLIEDarling Ji Dominicmargy                                 6/17/2021     FCE Score Sheet    The interpretation of [employer]Wells standardized functional testing is based on assumptions including normal breaks, basic ergonomic conditions and that the tested functions are usually not required more than 2/3 of a normal working day. If a function is required continuously, job related testing should be performed. Interpretation of observed function regarding activity during a normal working day    Frequency   Weighted Activities  Observed Effort Level Position/Ambulation  Quantitative + Qualitative Results   % of Workday   NEVER Contraindicated  Not Possible 0%    RARELY Maximum Significant Limitation 1-5%     OCCASIONALLY Heavy Some Limitation 6-33%     FREQUENTLY Low Slight/No Limitation 34-66%    SELF LIMITED Client stopped test; sub-maximum effort level Sub-max %      Lifting, Strength   (lbs) Unable  (Never) Max. (Rare)  0-5% day Heavy  (Occas)  6-33% day Low  (Freq)  34-66% day Activity Limitations Recommendations   Waist to Floor (11)   40 0 0 TRUNK WEAKNESS   BACK PAIN CLIENT WOULD REQUIRE A JOB WHERE A SITTING POSITION WAS AVAILABLE MOST OF THE DAY. Waist to [place] (Hands at Handles)      20    0   0    TRUNK WEAKNESS   BACK PAIN             Front Carry (Long)   50 0 0  TRUNK WEAKNESS   BACK PAIN    Push-Pull (Static) Force Generated Activity Limitations    Recommendations   Push Static   122  LBP INCREASES WITH TIME WHEN STANDING    Pull Static   133  LBP INCREASES WITH TIME WHEN STANDING    (There are numerous variables impacting Push/Pull including load, equipment, surface, etc.  This is not meant to indicate the weight that is moved. )    Posture, Flexibility, Ambulation Unable  (Never) Significant   Limitation  (Rare)  0-5% day Some Limitation  (Occas)  6-33%day No Limitation   (Freq)  34-66% day Activity Limitations Recommendations   Elevated Work (Weighted)     X   LBP INCREASES WITH TIME WHEN STANDING      Fwd Bend Standing      X    LBP INCREASES WITH TIME WHEN STANDING    Sitting     X     Standing Work  X    LBP INCREASES WITH TIME WHEN STANDING    Static Standing   X    LBP INCREASES WITH TIME WHEN STANDING    Walking- 6MWT    X   BACK PAIN  TRUNK WEAKNESS    [place]    X   DIFFICULTY ATTAINING AND LEAVING POSITION    Kneeling / Half Kneeling      X     DIFFICULTY ATTAINING AND LEAVING POSITION    Squatting   X  DECONDITIONING    Stairs   X   DECONDITIONING    Step Ladder- Two Handed     X  DECONDITIONING               Hand/Finger Strength Force Generated  (pounds) Mean for Age/Gender Values for approx 2/3 of this age/gender group range  Activity Limitation Recommendations   Hand  Right  84 90       Hand  Left  95 77 57-97                      Coordination Standard Score Rating Activity Limitation Recommendations   PCE Round Blocks Dominant Hand 71  AVG       PCE Round Blocks NonDominant Hand 71  AVG      PCE Nuts/Bolts Dominant Hand 125  ABOVE AVERAGE      PCE Nuts/Bolts NonDominant Hand 112  ABOVE AVERAGE      PCE Peg Board Dominant Hand 85  AVERAGE      PCE Peg Board NonDominant Hand 85 AVERAGE          ________________                       6/16/2021  Signature                                  Date:

## 2021-07-22 ENCOUNTER — CLINICAL DOCUMENTATION (OUTPATIENT)
Dept: NUTRITION | Age: 66
End: 2021-07-22

## 2021-07-22 NOTE — PROGRESS NOTES
Nutrition Follow Up Note    Date: 7/22/2021  Patient: Lulu Cuevas  Referring Clinician: Dr. Coleman Coto  Patient's Last Session: 4/29/21  Reason for Visit:   Obesity Class III     Progress with SMART goals:   Pt stated he realized he identifies feeling full as \"I can't eat one more bite\"  We will work on him identifying when he's full vs overly full     Current eating pattern:   24 hr recall  B: 2 c coffee w/ cream & 1 slice rye toast w/ butter  L: Black bean salad w/ brown rice, black beans, broccoli, & salad kit   D: Chicken burrito salad: Erick, lettuce, fajita vegetables, cheese, guac & chips   Snacks: 2 oatmeal raisin cookies    Water/d: 32oz  Unsweet green tea/wk: 2-3x/wk   Takeout/wk: 1x    Current exercise routine:   None per pt     Last visit weight: 333#  CBW: 330#  Height: 71\"     Handouts given:   Fiber sources, examples of balanced breakfast, lunch, dinner & snack ideas     Notes:   Pt stated he has not reviewed the information I provided over gentle nutrition or how to create a well balanced plate. He stated he's frustrated not knowing how to create a balanced meal so I asked why he hasn't utilized the info given for this & he stated he forgot about it. Updated plan:   I will assess pt's po intake & adherence to goals     Motivation level over course of our last session: 8   Motivation level at the end of the session: 8     Smart Goals:   1.   Work on creating balanced meals w/ cho, pro & fat - utilizing the information provided     Follow up plan:   9/23/21, 10am     Electronically signed by: Khadijah Mendez RD, LD on 7/22/21 at 10:27 AM EDT

## 2021-07-22 NOTE — LETTER
[address]  [address] [facility], [facility] [address]  Phone: [phone]  Fax: [phone]    July 22, 2021    Dr. Alba Joaquin  Fax: [phone]    Patient: Mariam Rogers   YOB: 1955   Date of Visit: 7/22/2021       Dear Dr. Alba Joaquin,      Thank you for referring Ze Wheeler to me for nutrition counseling. Attached is my note. If you have questions, please do not hesitate to call me. I look forward to following this patient along with you. Sincerely,    Electronically signed by Gui Angela RD, LD on 7/22/21 at 10:27 AM EDT                                                            Nutrition Follow Up Note    Date: 7/22/2021  Patient: Mariam Rogers  Referring Clinician: Dr. Alba Joaquin  Patient's Last Session: 4/29/21  Reason for Visit:   Obesity Class III     Progress with SMART goals:   Pt stated he realized he identifies feeling full as \"I can't eat one more bite\"  We will work on him identifying when he's full vs overly full     Current eating pattern:   24 hr recall  B: 2 c coffee w/ cream & 1 slice rye toast w/ butter  L: Black bean salad w/ brown rice, black beans, broccoli, & salad kit   D: Chicken burrito salad: Erick, lettuce, fajita vegetables, cheese, guac & chips   Snacks: 2 oatmeal raisin cookies    Water/d: 32oz  Unsweet green tea/wk: 2-3x/wk   Takeout/wk: 1x    Current exercise routine:   None per pt     Last visit weight: 333#  CBW: 330#  Height: 71\"     Handouts given:   Fiber sources, examples of balanced breakfast, lunch, dinner & snack ideas     Notes:   Pt stated he has not reviewed the information I provided over gentle nutrition or how to create a well balanced plate. He stated he's frustrated not knowing how to create a balanced meal so I asked why he hasn't utilized the info given for this & he stated he forgot about it.      Updated plan:   I will assess pt's po intake & adherence to goals Motivation level over course of our last session: 8   Motivation level at the end of the session: 8     Smart Goals:   1.   Work on creating balanced meals w/ cho, pro & fat - utilizing the information provided     Follow up plan:   9/23/21, 10am     Electronically signed by: Nery Martines RD, LD on 7/22/21 at 10:27 AM EDT

## 2021-10-06 ENCOUNTER — HOSPITAL ENCOUNTER (OUTPATIENT)
Dept: PHYSICAL THERAPY | Age: 66
Setting detail: THERAPIES SERIES
Discharge: HOME | End: 2021-10-06
Payer: COMMERCIAL

## 2021-10-06 PROCEDURE — 9900000063 HC PREWORK SCREEN GENERAL: Performed by: PHYSICAL THERAPIST

## 2021-11-09 ENCOUNTER — CLINICAL DOCUMENTATION (OUTPATIENT)
Dept: NUTRITION | Age: 66
End: 2021-11-09

## 2021-11-09 NOTE — PROGRESS NOTES
Patient No Show      Scheduled Date: 11/9/2021    Patient: Ming Olivier    Referring Clinician: Dr. Anabel Toribio    Dear Dr. Anabel Toribio    Thank you for referring Ming Olivier to [place] [id number] for outpatient nutrition counseling. Ming Olivier did not keep scheduled appointment on 11/9/2021. He cancelled his nutrition counseling appointment this morning. He has now late canceled/no showed to 2 consecutive sessions. If he reschedules & doesn't give 24 hours notice of needing to reschedule/no showing, he will be terminated from our nutrition counseling program.     If I can be of further assistance with this patient, please contact me.      Thank you,    Emilie Beyr, RD, [address] [facility]  Outpatient Dietitian   Phone: [phone]  Fax: [phone]

## 2021-12-03 NOTE — PROGRESS NOTES
Physical Therapy Progress Note    Date: 2021  Patient Name: Ming Olivier  : 1955   MRN: [id number]    Pt called to cancel PT appt. today. He has Covid symptoms and is going to be tested for Covid. His wife is being released from [facility] this  Evening. He will call to reschedule his PWS.     Mike Buchanan, PT

## 2021-12-06 ENCOUNTER — HOSPITAL ENCOUNTER (OUTPATIENT)
Dept: PHYSICAL THERAPY | Age: 66
Setting detail: THERAPIES SERIES
Discharge: HOME | End: 2021-12-06

## 2021-12-09 ENCOUNTER — HOSPITAL ENCOUNTER (EMERGENCY)
Age: 66
Discharge: LEFT BEFORE DISCHARGE | End: 2021-12-09

## 2021-12-09 VITALS — HEART RATE: 90 BPM | OXYGEN SATURATION: 93 %

## 2021-12-30 ENCOUNTER — HOSPITAL ENCOUNTER (OUTPATIENT)
Dept: PHYSICAL THERAPY | Age: 66
Setting detail: THERAPIES SERIES
Discharge: HOME | End: 2021-12-30

## 2021-12-30 PROCEDURE — 9900000063 HC PREWORK SCREEN GENERAL: Performed by: PHYSICAL THERAPIST

## 2022-02-04 ENCOUNTER — HOSPITAL ENCOUNTER (OUTPATIENT)
Dept: PHYSICAL THERAPY | Age: 67
Setting detail: THERAPIES SERIES
Discharge: HOME | End: 2022-02-04

## 2022-02-04 PROCEDURE — 9900000063 HC PREWORK SCREEN GENERAL: Performed by: PHYSICAL THERAPIST

## 2022-04-04 ENCOUNTER — HOSPITAL ENCOUNTER (OUTPATIENT)
Dept: PHYSICAL THERAPY | Age: 67
Setting detail: THERAPIES SERIES
Discharge: HOME | End: 2022-04-04

## 2022-04-04 PROCEDURE — 9900000063 HC PREWORK SCREEN GENERAL: Performed by: PHYSICAL THERAPIST

## 2022-06-03 ENCOUNTER — HOSPITAL ENCOUNTER (OUTPATIENT)
Dept: PHYSICAL THERAPY | Age: 67
Setting detail: THERAPIES SERIES
Discharge: HOME | End: 2022-06-03

## 2022-06-03 PROCEDURE — 9900000063 HC PREWORK SCREEN GENERAL: Performed by: PHYSICAL THERAPIST

## 2022-12-15 ENCOUNTER — OFFICE VISIT (OUTPATIENT)
Dept: PRIMARY CARE CLINIC | Age: 67
End: 2022-12-15
Payer: COMMERCIAL

## 2022-12-15 VITALS
SYSTOLIC BLOOD PRESSURE: 142 MMHG | HEIGHT: 71 IN | OXYGEN SATURATION: 98 % | TEMPERATURE: 97.5 F | DIASTOLIC BLOOD PRESSURE: 99 MMHG | WEIGHT: 315 LBS | BODY MASS INDEX: 44.1 KG/M2 | HEART RATE: 109 BPM

## 2022-12-15 DIAGNOSIS — I10 ESSENTIAL (PRIMARY) HYPERTENSION: Primary | ICD-10-CM

## 2022-12-15 DIAGNOSIS — Z11.59 NEED FOR HEPATITIS C SCREENING TEST: ICD-10-CM

## 2022-12-15 DIAGNOSIS — E78.2 MIXED HYPERLIPIDEMIA: ICD-10-CM

## 2022-12-15 DIAGNOSIS — K21.9 GERD WITH APNEA: ICD-10-CM

## 2022-12-15 DIAGNOSIS — E66.01 MORBID OBESITY (HCC): ICD-10-CM

## 2022-12-15 DIAGNOSIS — Z13.29 SCREENING FOR THYROID DISORDER: ICD-10-CM

## 2022-12-15 DIAGNOSIS — J30.5 ALLERGIC RHINITIS DUE TO FOOD: ICD-10-CM

## 2022-12-15 DIAGNOSIS — R06.81 GERD WITH APNEA: ICD-10-CM

## 2022-12-15 PROCEDURE — 3074F SYST BP LT 130 MM HG: CPT | Performed by: INTERNAL MEDICINE

## 2022-12-15 PROCEDURE — 99204 OFFICE O/P NEW MOD 45 MIN: CPT | Performed by: INTERNAL MEDICINE

## 2022-12-15 PROCEDURE — 1123F ACP DISCUSS/DSCN MKR DOCD: CPT | Performed by: INTERNAL MEDICINE

## 2022-12-15 PROCEDURE — 3078F DIAST BP <80 MM HG: CPT | Performed by: INTERNAL MEDICINE

## 2022-12-15 RX ORDER — FLUTICASONE PROPIONATE 50 MCG
2 SPRAY, SUSPENSION (ML) NASAL DAILY
Qty: 48 G | Refills: 1 | Status: SHIPPED | OUTPATIENT
Start: 2022-12-15

## 2022-12-15 RX ORDER — SILDENAFIL 50 MG/1
TABLET, FILM COATED ORAL
COMMUNITY
Start: 2022-12-08

## 2022-12-15 RX ORDER — DEXLANSOPRAZOLE 60 MG/1
60 CAPSULE, DELAYED RELEASE ORAL DAILY
Qty: 90 CAPSULE | Refills: 1 | Status: SHIPPED | OUTPATIENT
Start: 2022-12-15

## 2022-12-15 RX ORDER — LOSARTAN POTASSIUM 100 MG/1
100 TABLET ORAL DAILY
Qty: 30 TABLET | Refills: 3 | Status: SHIPPED | OUTPATIENT
Start: 2022-12-15

## 2022-12-15 RX ORDER — DULOXETIN HYDROCHLORIDE 60 MG/1
1 CAPSULE, DELAYED RELEASE ORAL 2 TIMES DAILY
COMMUNITY
Start: 2022-10-03

## 2022-12-15 RX ORDER — KETOCONAZOLE 20 MG/G
CREAM TOPICAL
COMMUNITY
Start: 2022-10-11

## 2022-12-15 SDOH — ECONOMIC STABILITY: FOOD INSECURITY: WITHIN THE PAST 12 MONTHS, THE FOOD YOU BOUGHT JUST DIDN'T LAST AND YOU DIDN'T HAVE MONEY TO GET MORE.: NEVER TRUE

## 2022-12-15 SDOH — ECONOMIC STABILITY: FOOD INSECURITY: WITHIN THE PAST 12 MONTHS, YOU WORRIED THAT YOUR FOOD WOULD RUN OUT BEFORE YOU GOT MONEY TO BUY MORE.: NEVER TRUE

## 2022-12-15 ASSESSMENT — SOCIAL DETERMINANTS OF HEALTH (SDOH): HOW HARD IS IT FOR YOU TO PAY FOR THE VERY BASICS LIKE FOOD, HOUSING, MEDICAL CARE, AND HEATING?: NOT HARD AT ALL

## 2022-12-15 ASSESSMENT — PATIENT HEALTH QUESTIONNAIRE - PHQ9
SUM OF ALL RESPONSES TO PHQ QUESTIONS 1-9: 0
1. LITTLE INTEREST OR PLEASURE IN DOING THINGS: 0
SUM OF ALL RESPONSES TO PHQ QUESTIONS 1-9: 0
2. FEELING DOWN, DEPRESSED OR HOPELESS: 0
SUM OF ALL RESPONSES TO PHQ QUESTIONS 1-9: 0
SUM OF ALL RESPONSES TO PHQ9 QUESTIONS 1 & 2: 0
SUM OF ALL RESPONSES TO PHQ QUESTIONS 1-9: 0

## 2022-12-15 NOTE — PROGRESS NOTES
Dheeraj Reina (:  1955) is a 74 y.o. male,Established patient, here for evaluation of the following chief complaint(s):  New Patient      Subjective   SUBJECTIVE/OBJECTIVE:  HPI  Hypertension:  Patient is here for follow up chronic hypertension. This is  generally controlled on current medication regimen. BP Readings from Last 1 Encounters:   12/15/22 (!) 142/99        Takes meds as directed and tolerates them well. Most recent labs reviewed with patient and are not remarkable. No symptoms from htn standpoint per ROS. Patient is  compliant with lifestyle modifications. Patient does not smoke. Comorbid conditions include obesity  GERD severe has been taking Dexilant was helping he does not take it anymore he is using over-the-counter medications its not helping enough he has choking he has spasmodic cough episodic and make him almost passing out from cough. He has been seen by ENT in the past and was told to take his medications for acid reflux. He used to smoke quit 1997 he smoked for about 5 years he is 5-pack-year  Obstructive sleep apnea patient is not using the CPAP machine because he gets him panic attacks he had uvulectomy done before  Review of Systems   All other systems reviewed and are negative.            Objective   BP (!) 142/99   Pulse (!) 109   Temp 97.5 °F (36.4 °C) (Temporal)   Ht 5' 11\" (1.803 m)   Wt (!) 355 lb 12.8 oz (161.4 kg)   SpO2 98%   BMI 49.62 kg/m²   Current Outpatient Medications   Medication Sig Dispense Refill    losartan (COZAAR) 100 MG tablet Take 1 tablet by mouth daily 30 tablet 3    dexlansoprazole (DEXILANT) 60 MG CPDR delayed release capsule Take 1 capsule by mouth daily 90 capsule 1    fluticasone (FLONASE) 50 MCG/ACT nasal spray 2 sprays by Each Nostril route daily 48 g 1    Red Yeast Rice 600 MG TABS Take 1,200 mg by mouth daily      Omega 3-6-9 Fatty Acids (OMEGA 3-6-9 COMPLEX) CAPS Take 1 capsule by mouth daily      vitamin B-12 (CYANOCOBALAMIN) 100 MCG tablet Take 50 mcg by mouth daily      niacin (NIASPAN) 500 MG CR tablet Take 500 mg by mouth nightly. Cholecalciferol (VITAMIN D3) 5000 UNITS TABS Take  by mouth. Ascorbic Acid (VITAMIN C) 250 MG tablet Take 250 mg by mouth daily. DOXAZOSIN MESYLATE PO Take 4 mg by mouth daily       DHA-EPA-Coenzyme Q10-Vitamin E [id number] CAPS Take  by mouth.      multivitamin (THERAGRAN) per tablet Take 1 tablet by mouth daily. DULoxetine (CYMBALTA) 60 MG extended release capsule Take 1 capsule by mouth in the morning and at bedtime      hydrocortisone 2.5 % cream APPLY TO RASH IN GROIN AREA EVERY MORNING AS NEEDED      ketoconazole (NIZORAL) 2 % cream APPLY 1 APPLICATION TOPICALLY TO RASH IN GROIN AREA ONCE PER DAY FOR 30 DAYS USE ONLY AT NIGHT.      sildenafil (VIAGRA) 50 MG tablet       diclofenac (VOLTAREN) 75 MG EC tablet Take 75 mg by mouth 2 times daily (Patient not taking: Reported on 12/15/2022)      Saw Palmetto 160 MG CAPS Take 2 capsules by mouth daily (Patient not taking: Reported on 12/15/2022)       No current facility-administered medications for this visit.       No Known Allergies     Past Medical History:   Diagnosis Date    BPH (benign prostatic hyperplasia)     COPD (chronic obstructive pulmonary disease) (HCC)     GERD (gastroesophageal reflux disease)     Hyperlipidemia     Hypertension     Sleep apnea     doesnt use his cpap     Past Surgical History:   Procedure Laterality Date    COLONOSCOPY      PILONIDAL CYST EXCISION      OR SHLDR ARTHROSCOP,SURG,W/ROTAT CUFF REPR Right 5/24/2018    RIGHT SHOULDER ARTHROSCOPY,ROTATOR CUFF REPAIR, LABRAL DEBRIDEMENT, SUB-ACROMIAL DECOMPRESSION performed by ALEJA Centeno DO at [facility] ARTHROSCOPY Right     UVULECTOMY       Family History   Problem Relation Age of Onset    Thyroid Disease Father     Diabetes Father     Alzheimer's Disease Father     Alzheimer's Disease Paternal Grandmother     Alzheimer's Disease Paternal Grandfather       Social History     Socioeconomic History    Marital status:      Spouse name: Not on file    Number of children: Not on file    Years of education: Not on file    Highest education level: Not on file   Occupational History    Not on file   Tobacco Use    Smoking status: Former     Packs/day: 0.50     Years: 22.00     Pack years: 11.00     Types: Cigarettes     Quit date: 1997     Years since quittin.5    Smokeless tobacco: Never   Vaping Use    Vaping Use: Never used   Substance and Sexual Activity    Alcohol use: Yes     Comment: social    Drug use: No    Sexual activity: Not on file   Other Topics Concern    Not on file   Social History Narrative    Not on file     Social Determinants of Health     Financial Resource Strain: Low Risk     Difficulty of Paying Living Expenses: Not hard at all   Food Insecurity: No Food Insecurity    Worried About Running Out of Food in the Last Year: Never true    Ran Out of Food in the Last Year: Never true   Transportation Needs: Not on file   Physical Activity: Not on file   Stress: Not on file   Social Connections: Not on file   Intimate Partner Violence: Not on file   Housing Stability: Not on file      Health Maintenance Due   Topic Date Due    Pneumococcal 65+ years Vaccine (1 - PCV) Never done    Depression Screen  Never done    Hepatitis C screen  Never done    Diabetes screen  Never done    Lipids  Never done    Colorectal Cancer Screen  Never done    Shingles vaccine (1 of 2) Never done    AAA screen  2022    lski    Physical Exam  Constitutional:       General: He is not in acute distress. Appearance: Normal appearance. He is obese. HENT:      Head: Normocephalic and atraumatic. Right Ear: Tympanic membrane, ear canal and external ear normal.      Left Ear: Tympanic membrane, ear canal and external ear normal. There is no impacted cerumen. Nose: Rhinorrhea present. No congestion.       Mouth/Throat: Mouth: Mucous membranes are moist.      Pharynx: Oropharynx is clear. No oropharyngeal exudate or posterior oropharyngeal erythema. Comments: Uvula removed  Eyes:      General: No scleral icterus. Right eye: No discharge. Left eye: No discharge. Pupils: Pupils are equal, round, and reactive to light. Neck:      Vascular: No carotid bruit. Cardiovascular:      Rate and Rhythm: Normal rate and regular rhythm. Pulses: Normal pulses. Heart sounds: No murmur heard. No gallop. Pulmonary:      Effort: Pulmonary effort is normal. No respiratory distress. Breath sounds: Normal breath sounds. No wheezing, rhonchi or rales. Chest:      Chest wall: No tenderness. Abdominal:      General: Bowel sounds are normal.      Palpations: Abdomen is soft. There is no mass. Tenderness: There is no abdominal tenderness. There is no guarding. Hernia: No hernia is present. Musculoskeletal:         General: No swelling, tenderness, deformity or signs of injury. Cervical back: Normal range of motion and neck supple. No rigidity or tenderness. Right lower leg: No edema. Left lower leg: No edema. Lymphadenopathy:      Cervical: No cervical adenopathy. Skin:     General: Skin is warm and dry. Capillary Refill: Capillary refill takes 2 to 3 seconds. Findings: No bruising, lesion or rash. Neurological:      General: No focal deficit present. Mental Status: He is alert and oriented to person, place, and time. Sensory: No sensory deficit. Motor: No weakness. Gait: Gait normal.   Psychiatric:         Mood and Affect: Mood normal.         Behavior: Behavior normal.         Thought Content: Thought content normal.         Judgment: Judgment normal.               ASSESSMENT/PLAN:  1. Essential (primary) hypertension  -     losartan (COZAAR) 100 MG tablet;  Take 1 tablet by mouth daily, Disp-30 tablet, R-3Normal  -     Comprehensive Metabolic Panel; Future  -     CBC; Future  2. Need for hepatitis C screening test  -     Hepatitis C Antibody; Future  3. Mixed hyperlipidemia  -     Lipid, Fasting; Future  4. GERD with apnea  -     CBC; Future  -     dexlansoprazole (DEXILANT) 60 MG CPDR delayed release capsule; Take 1 capsule by mouth daily, Disp-90 capsule, R-1Normal  5. Morbid obesity ([address]  6. Screening for thyroid disorder  -     TSH; Future  7. Allergic rhinitis due to food  -     fluticasone (FLONASE) 50 MCG/ACT nasal spray; 2 sprays by Each Nostril route daily, Disp-48 g, R-1Normal      No follow-ups on file. An electronic signature was used to authenticate this note.     --Qi Yañez MD

## 2023-01-02 ENCOUNTER — HOSPITAL ENCOUNTER (EMERGENCY)
Dept: HOSPITAL 83 - ED | Age: 68
Discharge: HOME | End: 2023-01-02
Payer: COMMERCIAL

## 2023-01-02 VITALS — HEIGHT: 68.98 IN | BODY MASS INDEX: 46.65 KG/M2 | WEIGHT: 315 LBS

## 2023-01-02 DIAGNOSIS — Z79.899: ICD-10-CM

## 2023-01-02 DIAGNOSIS — J40: Primary | ICD-10-CM

## 2023-01-02 LAB
ALP SERPL-CCNC: 80 U/L (ref 46–116)
ALT SERPL W P-5'-P-CCNC: 50 U/L (ref 10–49)
BASOPHILS # BLD AUTO: 0 10*3/UL (ref 0–0.1)
BASOPHILS NFR BLD AUTO: 0.6 % (ref 0–1)
BUN SERPL-MCNC: 14 MG/DL (ref 9–23)
CHLORIDE SERPL-SCNC: 100 MMOL/L (ref 98–107)
EOSINOPHIL # BLD AUTO: 0.1 10*3/UL (ref 0–0.4)
EOSINOPHIL # BLD AUTO: 1.6 % (ref 1–4)
ERYTHROCYTE [DISTWIDTH] IN BLOOD BY AUTOMATED COUNT: 14.8 % (ref 0–14.5)
HCT VFR BLD AUTO: 38.4 % (ref 42–52)
LYMPHOCYTES # BLD AUTO: 0.4 10*3/UL (ref 1.3–4.4)
LYMPHOCYTES NFR BLD AUTO: 6.1 % (ref 27–41)
MCH RBC QN AUTO: 29.2 PG (ref 27–31)
MCHC RBC AUTO-ENTMCNC: 31.8 G/DL (ref 33–37)
MCV RBC AUTO: 91.9 FL (ref 80–94)
MONOCYTES # BLD AUTO: 1 10*3/UL (ref 0.1–1)
MONOCYTES NFR BLD MANUAL: 16.2 % (ref 3–9)
NEUT #: 4.6 10*3/UL (ref 2.3–7.9)
NEUT %: 75 % (ref 47–73)
NRBC BLD QL AUTO: 0 10*3/UL (ref 0–0)
PLATELET # BLD AUTO: 182 10*3/UL (ref 130–400)
PMV BLD AUTO: 9.3 FL (ref 9.6–12.3)
POTASSIUM SERPL-SCNC: 4.2 MMOL/L (ref 3.4–5.1)
PROT SERPL-MCNC: 6.8 GM/DL (ref 6–8)
RBC # BLD AUTO: 4.18 10*6/UL (ref 4.5–5.9)
WBC NRBC COR # BLD AUTO: 6.2 10*3/UL (ref 4.8–10.8)

## 2023-01-11 DIAGNOSIS — I10 ESSENTIAL (PRIMARY) HYPERTENSION: ICD-10-CM

## 2023-01-11 RX ORDER — LOSARTAN POTASSIUM 100 MG/1
TABLET ORAL
Qty: 30 TABLET | Refills: 3 | OUTPATIENT
Start: 2023-01-11

## 2023-01-14 PROBLEM — Z13.29 SCREENING FOR THYROID DISORDER: Status: RESOLVED | Noted: 2022-12-15 | Resolved: 2023-01-14

## (undated) DEVICE — NDLCTR: FOAM/MAG 40CT 64/CS: Brand: MEDICAL ACTION INDUSTRIES

## (undated) DEVICE — 3M™ STERI-DRAPE™ U-DRAPE 1015: Brand: STERI-DRAPE™

## (undated) DEVICE — SUPER TURBOVAC 90 INTEGRATED CABLE WAND ICW: Brand: COBLATION

## (undated) DEVICE — GAUZE,SPONGE,4"X4",16PLY,STRL,LF,10/TRAY: Brand: MEDLINE

## (undated) DEVICE — ELECTRODE PT RET AD L9FT HI MOIST COND ADH HYDRGEL CORDED

## (undated) DEVICE — BANDAGE,SELF ADHRNT,COFLEX,4"X5YD,STRL: Brand: COLABEL

## (undated) DEVICE — GAUZE,SPONGE,4"X4",16PLY,XRAY,STRL,LF: Brand: MEDLINE

## (undated) DEVICE — SYRINGE, LUER LOCK, 10ML: Brand: MEDLINE

## (undated) DEVICE — DRAPE,SHOULDER,ORTHOMAX,W/POUCH,5/CS: Brand: MEDLINE

## (undated) DEVICE — SMARTGOWN BREATHABLE SPECIALTY GOWN: Brand: CONVERTORS

## (undated) DEVICE — TUBING PMP L16FT MAIN DISP FOR AR-6400 AR-6475

## (undated) DEVICE — PADDING CAST W4INXL4YD NONSTERILE COT COHESIVE HND TEARABLE

## (undated) DEVICE — INTENDED FOR TISSUE SEPARATION, AND OTHER PROCEDURES THAT REQUIRE A SHARP SURGICAL BLADE TO PUNCTURE OR CUT.: Brand: BARD-PARKER ® STAINLESS STEEL BLADES

## (undated) DEVICE — SET SURG INSTR DISSECT

## (undated) DEVICE — [AGGRESSIVE PLUS CUTTER, ARTHROSCOPIC SHAVER BLADE,  DO NOT RESTERILIZE,  DO NOT USE IF PACKAGE IS DAMAGED,  KEEP DRY,  KEEP AWAY FROM SUNLIGHT]: Brand: FORMULA

## (undated) DEVICE — MEDI-VAC YANKAUER SUCTION HANDLE: Brand: CARDINAL HEALTH

## (undated) DEVICE — NEEDLE SUT PASS FOR ROT CUF LABRAL REP MULTFI SCORPION

## (undated) DEVICE — DOUBLE BASIN SET: Brand: MEDLINE INDUSTRIES, INC.

## (undated) DEVICE — SOLUTION IV IRRIG LACTATED RINGERS 3000ML 2B7487

## (undated) DEVICE — SHEET DRAPE FULL 70X100

## (undated) DEVICE — [AGGRESSIVE 6-FLUTE BARREL BUR, ARTHROSCOPIC SHAVER BLADE,  DO NOT RESTERILIZE,  DO NOT USE IF PACKAGE IS DAMAGED,  KEEP DRY,  KEEP AWAY FROM SUNLIGHT]: Brand: FORMULA

## (undated) DEVICE — CAMERA STRYKER 1488 HD GEN

## (undated) DEVICE — TOWEL,OR,DSP,ST,BLUE,STD,6/PK,12PK/CS: Brand: MEDLINE

## (undated) DEVICE — ELASTIC BANDAGE: Brand: DEROYAL

## (undated) DEVICE — GOWN,SIRUS,FABRNF,XL,20/CS: Brand: MEDLINE

## (undated) DEVICE — Z DISCONTINUED NO SUB IDED GLOVE SURG BEAD CUF 8 STD PF WHT STRL TRIUMPH LT LTX

## (undated) DEVICE — MARKER,SKIN,WI/RULER AND LABELS: Brand: MEDLINE

## (undated) DEVICE — TUBING, SUCTION, 1/4" X 10', STRAIGHT: Brand: MEDLINE

## (undated) DEVICE — BANDAGE COMPR W3INXL5YD WHT BGE POLY COT M E WRP WV HK AND

## (undated) DEVICE — SLING ARM L ABV 13IN DE-ROTATION STRP HOOKS PROVIDE IMMOB

## (undated) DEVICE — HYPODERMIC SAFETY NEEDLE: Brand: MAGELLAN

## (undated) DEVICE — Z DISCONTINUED USE 2275686 GLOVE SURG SZ 8 L12IN FNGR THK13MIL WHT ISOLEX POLYISOPRENE

## (undated) DEVICE — DRESSING GZ W1XL8IN COT XRFRM N ADH OVERWRAP CURAD